# Patient Record
Sex: FEMALE | URBAN - METROPOLITAN AREA
[De-identification: names, ages, dates, MRNs, and addresses within clinical notes are randomized per-mention and may not be internally consistent; named-entity substitution may affect disease eponyms.]

---

## 2022-12-04 ENCOUNTER — EMERGENCY (EMERGENCY)
Facility: HOSPITAL | Age: 32
LOS: 1 days | Discharge: LEFT BEFORE DISCHARGE | End: 2022-12-04
Admitting: EMERGENCY MEDICINE

## 2022-12-04 VITALS
OXYGEN SATURATION: 100 % | RESPIRATION RATE: 20 BRPM | DIASTOLIC BLOOD PRESSURE: 60 MMHG | HEART RATE: 128 BPM | TEMPERATURE: 99 F | SYSTOLIC BLOOD PRESSURE: 101 MMHG

## 2022-12-04 PROCEDURE — L9991: CPT

## 2022-12-04 NOTE — ED ADULT TRIAGE NOTE - CHIEF COMPLAINT QUOTE
Pt st" I have had a cough and fevers since Monday and have chest pain and difficulty breathing....went to Urgent care tested neg Covid and FLu...treated for possible Bronchitis, given inhaler, zpak and cough syrup but not helping....[facility]  but left was not seen after many hours. "